# Patient Record
Sex: MALE | Race: ASIAN | Employment: STUDENT | URBAN - METROPOLITAN AREA
[De-identification: names, ages, dates, MRNs, and addresses within clinical notes are randomized per-mention and may not be internally consistent; named-entity substitution may affect disease eponyms.]

---

## 2017-10-16 ENCOUNTER — OFFICE VISIT (OUTPATIENT)
Dept: PEDIATRICS | Facility: CLINIC | Age: 22
End: 2017-10-16
Payer: COMMERCIAL

## 2017-10-16 VITALS
SYSTOLIC BLOOD PRESSURE: 126 MMHG | BODY MASS INDEX: 31.3 KG/M2 | WEIGHT: 236.2 LBS | TEMPERATURE: 97.3 F | HEART RATE: 62 BPM | DIASTOLIC BLOOD PRESSURE: 82 MMHG | HEIGHT: 73 IN

## 2017-10-16 DIAGNOSIS — Z23 NEED FOR HPV VACCINATION: ICD-10-CM

## 2017-10-16 DIAGNOSIS — Z23 NEED FOR PROPHYLACTIC VACCINATION AND INOCULATION AGAINST INFLUENZA: ICD-10-CM

## 2017-10-16 DIAGNOSIS — Z00.00 ROUTINE GENERAL MEDICAL EXAMINATION AT A HEALTH CARE FACILITY: Primary | ICD-10-CM

## 2017-10-16 PROCEDURE — 90471 IMMUNIZATION ADMIN: CPT | Performed by: INTERNAL MEDICINE

## 2017-10-16 PROCEDURE — 90472 IMMUNIZATION ADMIN EACH ADD: CPT | Performed by: INTERNAL MEDICINE

## 2017-10-16 PROCEDURE — 99385 PREV VISIT NEW AGE 18-39: CPT | Mod: 25 | Performed by: INTERNAL MEDICINE

## 2017-10-16 PROCEDURE — 90651 9VHPV VACCINE 2/3 DOSE IM: CPT | Performed by: INTERNAL MEDICINE

## 2017-10-16 PROCEDURE — 90686 IIV4 VACC NO PRSV 0.5 ML IM: CPT | Performed by: INTERNAL MEDICINE

## 2017-10-16 NOTE — PATIENT INSTRUCTIONS
Focus on healthy balanced diet and increasing physical activity.  Try getting a fitbit or go to [email or web address] [place] [employer].        Preventive Health Recommendations  Male Ages 18 - 25     Yearly exam:             See your health care provider every year in order to  o   Review health changes.   o   Discuss preventive care.    o   Review your medicines if your doctor has prescribed any.    You should be tested each year for STDs (sexually transmitted diseases).     Talk to your provider about cholesterol testing.      If you are at risk for diabetes, you should have a diabetes test (fasting glucose).    Shots: Get a flu shot each year. Get a tetanus shot every 10 years.     Nutrition:    Eat at least 5 servings of fruits and vegetables daily.     Eat whole-grain bread, whole-wheat pasta and brown rice instead of white grains and rice.     Talk to your provider about calcium and Vitamin D.     Lifestyle    Exercise for at least 150 minutes a week (30 minutes a day, 5 days a week). This will help you control your weight and prevent disease.     Limit alcohol to one drink per day.     No smoking.     Wear sunscreen to prevent skin cancer.     See your dentist every six months for an exam and cleaning.

## 2017-10-16 NOTE — PROGRESS NOTES
"  SUBJECTIVE:   CC: Cynthia Morse is an 22 year old male who presents for preventative health visit.     Healthy Habits:    Do you get at least three servings of calcium containing foods daily (dairy, green leafy vegetables, etc.)? {YES/NO, DAIRY INTAKE:906673::\"yes\"}    Amount of exercise or daily activities, outside of work: {AMOUNT EXERCISE:030098}    Problems taking medications regularly {Yes /No default:428682::\"No\"}    Medication side effects: {Yes /No default.:101382::\"No\"}    Have you had an eye exam in the past two years? {YESNOBLANK:115517}    Do you see a dentist twice per year? {YESNOBLANK:400673}    Do you have sleep apnea, excessive snoring or daytime drowsiness?{YESNOBLANK:062920}    {Outside tests to abstract? :776733}    {additional problems to add (Optional):555891}    Today's PHQ-2 Score: No flowsheet data found.  {PHQ-2 LOOK IN ASSESSMENTS :210429}  Abuse: Current or Past(Physical, Sexual or Emotional)- {YES/NO/NA:245505}  Do you feel safe in your environment - {YES/NO/NA:244896}    Social History   Substance Use Topics     Smoking status: Not on file     Smokeless tobacco: Not on file     Alcohol use Not on file     {ETOH AUDIT:158056}    Last PSA: No results found for: PSA    Reviewed orders with patient. Reviewed health maintenance and updated orders accordingly - {Yes/No:464989::\"Yes\"}  {Chronicprobdata (Optional):733523}    Reviewed and updated as needed this visit by clinical staff         Reviewed and updated as needed this visit by Provider        {HISTORY OPTIONS (Optional):697320}    ROS:  {MALE ROS-adult preventive care package:835055::\"C: NEGATIVE for fever, chills, change in weight\",\"I: NEGATIVE for worrisome rashes, moles or lesions\",\"E: NEGATIVE for vision changes or irritation\",\"ENT: NEGATIVE for ear, mouth and throat problems\",\"R: NEGATIVE for significant cough or SOB\",\"CV: NEGATIVE for chest pain, palpitations or peripheral edema\",\"GI: NEGATIVE for nausea, abdominal pain, " "heartburn, or change in bowel habits\",\" male: negative for dysuria, hematuria, decreased urinary stream, erectile dysfunction, urethral discharge\",\"M: NEGATIVE for significant arthralgias or myalgia\",\"N: NEGATIVE for weakness, dizziness or paresthesias\",\"P: NEGATIVE for changes in mood or affect\"}    OBJECTIVE:   There were no vitals taken for this visit.  EXAM:  {Exam Choices:356239}    ASSESSMENT/PLAN:   {Diag Picklist:407450}    COUNSELING:  {MALE COUNSELING MESSAGES:984016::\"Reviewed preventive health counseling, as reflected in patient instructions\"}    {BP Counseling- Complete if BP >= 120/80  (Optional):531111}     has no tobacco history on file.  {Tobacco Cessation -- Complete if patient is a smoker (Optional):271799}  There is no height or weight on file to calculate BMI.   {Weight Management Plan (ACO) Complete if BMI is abnormal-  Ages 18-64  BMI >24.9.  Age 65+ with BMI <23 or >30 (Optional):728859}    Counseling Resources:  ATP IV Guidelines  Pooled Cohorts Equation Calculator  FRAX Risk Assessment  ICSI Preventive Guidelines  Dietary Guidelines for Americans, 2010  USDA's MyPlate  ASA Prophylaxis  Lung CA Screening    Lonnie Gonzalez MD  [facility]  "

## 2017-10-16 NOTE — MR AVS SNAPSHOT
After Visit Summary   10/16/2017    Cynthia Morse    MRN: [id number]           Patient Information     Date Of Birth          1995        Visit Information        Provider Department      10/16/2017 11:50 AM Amarjit Gonzalez Mai, MD [place] [facility]        Today's Diagnoses     Routine general medical examination at a health care facility    -  1    Need for HPV vaccination        Need for prophylactic vaccination and inoculation against influenza          Care Instructions    Focus on healthy balanced diet and increasing physical activity.  Try getting a fitbit or go to www.[employer].        Preventive Health Recommendations  Male Ages 18 - 25     Yearly exam:             See your health care provider every year in order to  o   Review health changes.   o   Discuss preventive care.    o   Review your medicines if your doctor has prescribed any.    You should be tested each year for STDs (sexually transmitted diseases).     Talk to your provider about cholesterol testing.      If you are at risk for diabetes, you should have a diabetes test (fasting glucose).    Shots: Get a flu shot each year. Get a tetanus shot every 10 years.     Nutrition:    Eat at least 5 servings of fruits and vegetables daily.     Eat whole-grain bread, whole-wheat pasta and brown rice instead of white grains and rice.     Talk to your provider about calcium and Vitamin D.     Lifestyle    Exercise for at least 150 minutes a week (30 minutes a day, 5 days a week). This will help you control your weight and prevent disease.     Limit alcohol to one drink per day.     No smoking.     Wear sunscreen to prevent skin cancer.     See your dentist every six months for an exam and cleaning.             Follow-ups after your visit        Who to contact     If you have questions or need follow up information about today's clinic visit or your schedule please contact [facility] directly at  "[phone].  Normal or non-critical lab and imaging results will be communicated to you by MyChart, letter or phone within 4 business days after the clinic has received the results. If you do not hear from us within 7 days, please contact the clinic through [employer] or phone. If you have a critical or abnormal lab result, we will notify you by phone as soon as possible.  Submit refill requests through [employer] or call your pharmacy and they will forward the refill request to us. Please allow 3 business days for your refill to be completed.          Additional Information About Your Visit        [employer] Information     [employer] lets you send messages to your doctor, view your test results, renew your prescriptions, schedule appointments and more. To sign up, go to [email or web address] . Click on \"Log in\" on the left side of the screen, which will take you to the Welcome page. Then click on \"Sign up Now\" on the right side of the page.     You will be asked to enter the access code listed below, as well as some personal information. Please follow the directions to create your username and password.     Your access code is: [email or web address]  Expires: 2018 12:45 PM     Your access code will  in 90 days. If you need help or a new code, please call your [place] clinic or [phone].        Care EveryWhere ID     This is your Care EveryWhere ID. This could be used by other organizations to access your [place] medical records  [id number]        Your Vitals Were     Pulse Temperature Height BMI (Body Mass Index)          62 97.3  F (36.3  C) (Tympanic) 6' 0.5\" (1.842 m) 31.59 kg/m2         Blood Pressure from Last 3 Encounters:   10/16/17 126/82    Weight from Last 3 Encounters:   10/16/17 236 lb 3.2 oz (107.1 kg)              We Performed the Following     FLU VAC, SPLIT VIRUS IM > 3 YO (QUADRIVALENT) [57880]     HUMAN PAPILLOMA VIRUS (GARDASIL 9) VACCINE     Vaccine Administration, Each Additional [60817]     " VACCINE ADMINISTRATION, INITIAL        Primary Care Provider    None Specified       No primary provider on file.        Equal Access to Services     IGLESIA LANDON : Hadii keeley Buckley, mariluz hernandez, shaniqua bustamante. So [facility] [phone].    ATENCIÓN: Si habla español, tiene a epperson disposición servicios gratuitos de asistencia lingüística. Llame al [phone].    We comply with applicable federal civil rights laws and [place] laws. We do not discriminate on the basis of race, color, national origin, age, disability, sex, sexual orientation, or gender identity.            Thank you!     Thank you for choosing [facility]  for your care. Our goal is always to provide you with excellent care. Hearing back from our patients is one way we can continue to improve our services. Please take a few minutes to complete the written survey that you may receive in the mail after your visit with us. Thank you!             Your Updated Medication List - Protect others around you: Learn how to safely use, store and throw away your medicines at www.disposemymeds.org.      Notice  As of 10/16/2017 12:45 PM    You have not been prescribed any medications.

## 2017-10-16 NOTE — PROGRESS NOTES
SUBJECTIVE:   CC: Cynthia Morse is an 22 year old woman who presents for preventive health visit.     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  Is considering a management degree after.  He is here for routine exam, but notes that he has a mild viral illness -- feeling generalized malaise, runny nose, mild cough, pain in jaw over the last few days.  There is a virus going around at school.    Had routine lab work completed in August 2017.      Today's PHQ-2 Score:   PHQ-2 ( 1999 Pfizer) 10/16/2017   Q1: Little interest or pleasure in doing things 0   Q2: Feeling down, depressed or hopeless 0   PHQ-2 Score 0   Q1: Little interest or pleasure in doing things Not at all   Q2: Feeling down, depressed or hopeless Not at all   PHQ-2 Score 0       Abuse: Current or Past(Physical, Sexual or Emotional)- No  Do you feel safe in your environment - Yes    Social History   Substance Use Topics     Smoking status: Never Smoker     Smokeless tobacco: Never Used     Alcohol use Yes      Comment: rare     The patient does not drink >3 drinks per day nor >7 drinks per week.    Reviewed orders with patient.  Reviewed health maintenance and updated orders accordingly - Yes    Reviewed and updated as needed this visit by clinical staff  Tobacco  Allergies  Meds  Med Hx  Surg Hx  Fam Hx  Soc Hx      Reviewed and updated as needed this visit by Provider        History reviewed. No pertinent past medical history.   History reviewed.  "No pertinent surgical history.    ROS:  All other systems on a 10-point review are negative     OBJECTIVE:   /82  Pulse 62  Temp 97.3  F (36.3  C) (Tympanic)  Ht 6' 0.5\" (1.842 m)  Wt 236 lb 3.2 oz (107.1 kg)  BMI 31.59 kg/m2  EXAM:  GENERAL: healthy, alert and no distress  EYES: Eyes grossly normal to inspection, PERRL and conjunctivae and sclerae normal  HENT: ear canals and TM's normal, nose with mild congestion, mouth without ulcers or lesions  NECK: no adenopathy, no asymmetry, masses, or scars and thyroid normal to palpation  RESP: lungs clear to auscultation - no rales, rhonchi or wheezes  CV: regular rate and rhythm, normal S1 S2, no S3 or S4, no murmur, click or rub, no peripheral edema and peripheral pulses strong  ABDOMEN: soft, nontender, no hepatosplenomegaly, no masses and bowel sounds normal  MS: no gross musculoskeletal defects noted, no edema  SKIN: no suspicious lesions or rashes  PSYCH: mentation appears normal, affect normal/bright    ASSESSMENT/PLAN:   (Z00.00) Routine general medical examination at a health care facility  (primary encounter diagnosis)  Comment: no concerns.  Mild URI.  Plan: observe    (Z68.31) BMI 31.0-31.9,adult  Comment: counseled on diet and exercise  Plan: f/u in 6 months    (Z23) Need for HPV vaccination  Plan: HUMAN PAPILLOMA VIRUS (GARDASIL 9) VACCINE,         VACCINE ADMINISTRATION, INITIAL    (Z23) Need for prophylactic vaccination and inoculation against influenza  Plan: FLU VAC, SPLIT VIRUS IM > 3 YO (QUADRIVALENT)         [16818], Vaccine Administration, Each         Additional [36667], Vaccine Administration,         Initial [75278], CANCELED: FLU VAC, SPLIT VIRUS        IM > 3 YO (QUADRIVALENT) [48669]    COUNSELING:  Reviewed preventive health counseling, as reflected in patient instructions       Regular exercise       Healthy diet/nutrition         reports that he has never smoked. He has never used smokeless tobacco.    Estimated body mass index is " "31.59 kg/(m^2) as calculated from the following:    Height as of this encounter: 6' 0.5\" (1.842 m).    Weight as of this encounter: 236 lb 3.2 oz (107.1 kg).   Weight management plan: Discussed healthy diet and exercise guidelines and patient will follow up in 6 months in clinic to re-evaluate.    Counseling Resources:  ATP IV Guidelines  Pooled Cohorts Equation Calculator  Breast Cancer Risk Calculator  FRAX Risk Assessment  ICSI Preventive Guidelines  Dietary Guidelines for Americans, 2010  USDA's MyPlate  ASA Prophylaxis  Lung CA Screening    Lonnie Gonzalez MD  [facility]    Injectable Influenza Immunization Documentation    1.  Is the person to be vaccinated sick today?   No    2. Does the person to be vaccinated have an allergy to a component   of the vaccine?   No    3. Has the person to be vaccinated ever had a serious reaction   to influenza vaccine in the past?   No    4. Has the person to be vaccinated ever had Guillain-Barré syndrome?   No    Form completed by self         "

## 2017-10-16 NOTE — NURSING NOTE
"Chief Complaint   Patient presents with     Physical     Flu Shot       Initial /82  Pulse 62  Temp 97.3  F (36.3  C) (Tympanic)  Ht 6' 0.5\" (1.842 m)  Wt 236 lb 3.2 oz (107.1 kg)  BMI 31.59 kg/m2 Estimated body mass index is 31.59 kg/(m^2) as calculated from the following:    Height as of this encounter: 6' 0.5\" (1.842 m).    Weight as of this encounter: 236 lb 3.2 oz (107.1 kg).  Medication Reconciliation: complete  "

## 2017-10-16 NOTE — PROGRESS NOTES
Injectable Influenza Immunization Documentation    1.  Is the person to be vaccinated sick today?  Little under the weather    2. Does the person to be vaccinated have an allergy to a component   of the vaccine?   No    3. Has the person to be vaccinated ever had a serious reaction   to influenza vaccine in the past?   No    4. Has the person to be vaccinated ever had Guillain-Barré syndrome?   No    Form completed by Claritza Hogue MA           Answers for HPI/ROS submitted by the patient on 10/16/2017   Annual Exam:  Getting at least 3 servings of Calcium per day:: NO  Bi-annual eye exam:: Yes  Dental care twice a year:: Yes  Sleep apnea or symptoms of sleep apnea:: Excessive snoring  Diet:: Regular (no restrictions)  Frequency of exercise:: None  Taking medications regularly:: Yes  Medication side effects:: None  Additional concerns today:: No  PHQ-2 Score: 0

## 2018-12-01 ENCOUNTER — OFFICE VISIT (OUTPATIENT)
Dept: URGENT CARE | Facility: URGENT CARE | Age: 23
End: 2018-12-01
Payer: COMMERCIAL

## 2018-12-01 VITALS
OXYGEN SATURATION: 97 % | BODY MASS INDEX: 31.43 KG/M2 | TEMPERATURE: 98.4 F | DIASTOLIC BLOOD PRESSURE: 88 MMHG | WEIGHT: 235 LBS | SYSTOLIC BLOOD PRESSURE: 120 MMHG | HEART RATE: 76 BPM | RESPIRATION RATE: 12 BRPM

## 2018-12-01 DIAGNOSIS — J20.9 ACUTE BRONCHITIS, UNSPECIFIED ORGANISM: Primary | ICD-10-CM

## 2018-12-01 DIAGNOSIS — R05.9 COUGH: ICD-10-CM

## 2018-12-01 PROCEDURE — 99214 OFFICE O/P EST MOD 30 MIN: CPT | Mod: 25 | Performed by: PHYSICIAN ASSISTANT

## 2018-12-01 PROCEDURE — 94640 AIRWAY INHALATION TREATMENT: CPT | Performed by: PHYSICIAN ASSISTANT

## 2018-12-01 RX ORDER — ALBUTEROL SULFATE 0.83 MG/ML
2.5 SOLUTION RESPIRATORY (INHALATION) ONCE
Qty: 3 ML | Refills: 0
Start: 2018-12-01 | End: 2018-12-01

## 2018-12-01 RX ORDER — PREDNISONE 20 MG/1
40 TABLET ORAL DAILY
Qty: 10 TABLET | Refills: 0 | Status: SHIPPED | OUTPATIENT
Start: 2018-12-01 | End: 2018-12-06

## 2018-12-01 RX ORDER — BENZONATATE 200 MG/1
200 CAPSULE ORAL 3 TIMES DAILY PRN
Qty: 21 CAPSULE | Refills: 0 | Status: SHIPPED | OUTPATIENT
Start: 2018-12-01

## 2018-12-01 RX ORDER — ALBUTEROL SULFATE 90 UG/1
2 AEROSOL, METERED RESPIRATORY (INHALATION) EVERY 4 HOURS PRN
Qty: 1 INHALER | Refills: 0 | Status: SHIPPED | OUTPATIENT
Start: 2018-12-01

## 2018-12-01 NOTE — PROGRESS NOTES
SUBJECTIVE:   Cynthia Morse is a 23 year old male presenting with a chief complaint of cough - productive.  Onset of symptoms was 2 week(s) ago.  Course of illness is the same.    Severity moderate  Current and Associated symptoms: runny nose, cough - productive, intermittent shortness of breath, sore throat from coughing and headache  Treatment measures tried include Tylenol/Ibuprofen, OTC Cough med (Nyquil), fluids - hot teas, honeys, orange juice   Predisposing factors include exposure to other sick kids at the [place]; also has known seasonal allergies.     No past medical history on file.  No current outpatient prescriptions on file.     Social History   Substance Use Topics     Smoking status: Never Smoker     Smokeless tobacco: Never Used     Alcohol use Yes      Comment: rare       ROS:  Review of systems negative except as stated above.    OBJECTIVE:  /88  Pulse 76  Temp 98.4  F (36.9  C) (Oral)  Resp 12  Wt 235 lb (106.6 kg)  SpO2 97%  BMI 31.43 kg/m2  GENERAL APPEARANCE: healthy, alert and no distress  EYES: EOMI,  PERRL, conjunctiva clear  HENT: TM's normal bilaterally, nose and mouth without erythema, ulcers or lesions, no tonsilar hypertrophy or exudate, but nasal turbinates erythematous and swollen; posterior pharynx erythematous  NECK: supple, nontender, no lymphadenopathy  RESP: lungs clear to auscultation - no rales, rhonchi or wheezes, but decreased breath sounds RUL & RLL  CV: regular rates and rhythm, normal S1 S2, no murmur noted  NEURO: Normal strength and tone, sensory exam grossly normal,  normal speech and mentation  PSYCH: mentation appears normal and affect normal/bright    Albuterol neb administered - patient verbalized feeling a bit better after the nebulizer, easier breathing; repeat assessment by provider lungs clear throughout after neb.     ASSESSMENT:  Acute bronchitis - viral    PLAN:  Tylenol, Ibuprofen, Fluids and Rest - Rx prednisone burst to help with  airway inflammation, tessalon pearls to suppress cough and steroid inhaler 2 puffs PRN. Advised viral bronchitis can last up to 3 weeks; return if fevers, body aches, worsening productive cough, or symptoms persisting 3+ weeks. Discussed that this does not appear to have a pneumonia presentation and that chest x-ray not warranted; patient in agreement with plan.     The above evaluation was completed by Orquidea Mcintyre PA-C and transcribed by HARINI Ellison.      Pt seen in conjunction with  student, who served as a scribe for this encounter.  I independently perforned all the history and physical findings as documented in this note.  The assessment and plan reflects our joint decision-making.    Orquidea Mcintyre

## 2018-12-01 NOTE — MR AVS SNAPSHOT
"              After Visit Summary   12/1/2018    Cynthia Morse    MRN: [id number]           Patient Information     Date Of Birth          1995        Visit Information        Provider Department      12/1/2018 2:10 PM Orquidea Mcintyre PA-C [facility]        Today's Diagnoses     Acute bronchitis, unspecified organism    -  1    Cough           Follow-ups after your visit        Your next 10 appointments already scheduled     Dec 05, 2018  8:40 AM CST   SHORT with Gilma Spears NP   [facility]    [address]  [facility] [id number] [phone]              Who to contact     If you have questions or need follow up information about today's clinic visit or your schedule please contact [facility] directly at [phone].  Normal or non-critical lab and imaging results will be communicated to you by MyChart, letter or phone within 4 business days after the clinic has received the results. If you do not hear from us within 7 days, please contact the clinic through MyChart or phone. If you have a critical or abnormal lab result, we will notify you by phone as soon as possible.  Submit refill requests through [employer] or call your pharmacy and they will forward the refill request to us. Please allow 3 business days for your refill to be completed.          Additional Information About Your Visit        MyChart Information     [employer] lets you send messages to your doctor, view your test results, renew your prescriptions, schedule appointments and more. To sign up, go to [email or web address] . Click on \"Log in\" on the left side of the screen, which will take you to the Welcome page. Then click on \"Sign up Now\" on the right side of the page.     You will be asked to enter the access code listed below, as well as some personal information. Please follow the directions to create your username and " password.     Your access code is: [email or web address]  Expires: 3/1/2019  8:49 PM     Your access code will  in 90 days. If you need help or a new code, please call your [facility] or [phone].        Care EveryWhere ID     This is your Care EveryWhere ID. This could be used by other organizations to access your [place] medical records  [id number]        Your Vitals Were     Pulse Temperature Respirations Pulse Oximetry BMI (Body Mass Index)       76 98.4  F (36.9  C) (Oral) 12 97% 31.43 kg/m2        Blood Pressure from Last 3 Encounters:   18 120/88   10/16/17 126/82    Weight from Last 3 Encounters:   18 235 lb (106.6 kg)   10/16/17 236 lb 3.2 oz (107.1 kg)              We Performed the Following     INHALATION/NEBULIZER TREATMENT, INITIAL          Today's Medication Changes          These changes are accurate as of 18  8:49 PM.  If you have any questions, ask your nurse or doctor.               Start taking these medicines.        Dose/Directions    * albuterol (2.5 MG/3ML) 0.083% neb solution   Commonly known as:  PROVENTIL   Used for:  Cough, Acute bronchitis, unspecified organism   Started by:  Orquidea Mcintyre PA-C        Dose:  2.5 mg   Take 1 vial (2.5 mg) by nebulization once for 1 dose   Quantity:  3 mL   Refills:  0       * albuterol 108 (90 Base) MCG/ACT inhaler   Commonly known as:  PROAIR HFA/PROVENTIL HFA/VENTOLIN HFA   Used for:  Acute bronchitis, unspecified organism   Started by:  Orquidea Mcintyre PA-C        Dose:  2 puff   Inhale 2 puffs into the lungs every 4 hours as needed for shortness of breath / dyspnea or wheezing   Quantity:  1 Inhaler   Refills:  0       benzonatate 200 MG capsule   Commonly known as:  TESSALON   Used for:  Cough, Acute bronchitis, unspecified organism   Started by:  Orquidea Mcintyre PA-C        Dose:  200 mg   Take 1 capsule (200 mg) by mouth 3 times daily as needed for cough   Quantity:  21 capsule   Refills:  0        predniSONE 20 MG tablet   Commonly known as:  DELTASONE   Used for:  Cough, Acute bronchitis, unspecified organism   Started by:  Orquidea Mcintyre PA-C        Dose:  40 mg   Take 2 tablets (40 mg) by mouth daily for 5 days   Quantity:  10 tablet   Refills:  0       * Notice:  This list has 2 medication(s) that are the same as other medications prescribed for you. Read the directions carefully, and ask your doctor or other care provider to review them with you.         Where to get your medicines      These medications were sent to [id number] - [place], [place] - [address]  AT [address] [facility]  [address] [place] [address]     Phone:  [phone]     albuterol 108 (90 Base) MCG/ACT inhaler    benzonatate 200 MG capsule    predniSONE 20 MG tablet         Some of these will need a paper prescription and others can be bought over the counter.  Ask your nurse if you have questions.     You don't need a prescription for these medications     albuterol (2.5 MG/3ML) 0.083% neb solution                Primary Care Provider Fax #    Physician No Ref-Primary [phone]       No address on file        Equal Access to Services     [facility]: Hadii keeley Buckley, mariluz hernandez, qaybta kaalmataco dodge, shaniqua adames. So [facility] [phone].    ATENCIÓN: Si habla español, tiene a epperson disposición servicios gratuitos de asistencia lingüística. Llame al [phone].    We comply with applicable federal civil rights laws and [place] laws. We do not discriminate on the basis of race, color, national origin, age, disability, sex, sexual orientation, or gender identity.            Thank you!     Thank you for choosing [facility]  for your care. Our goal is always to provide you with excellent care. Hearing back from our patients is one way we can continue to improve our services. Please take a few minutes to complete the  written survey that you may receive in the mail after your visit with us. Thank you!             Your Updated Medication List - Protect others around you: Learn how to safely use, store and throw away your medicines at www.disposemAdvanced Power Projectseds.org.          This list is accurate as of 12/1/18  8:49 PM.  Always use your most recent med list.                   Brand Name Dispense Instructions for use Diagnosis    * albuterol (2.5 MG/3ML) 0.083% neb solution    PROVENTIL    3 mL    Take 1 vial (2.5 mg) by nebulization once for 1 dose    Cough, Acute bronchitis, unspecified organism       * albuterol 108 (90 Base) MCG/ACT inhaler    PROAIR HFA/PROVENTIL HFA/VENTOLIN HFA    1 Inhaler    Inhale 2 puffs into the lungs every 4 hours as needed for shortness of breath / dyspnea or wheezing    Acute bronchitis, unspecified organism       benzonatate 200 MG capsule    TESSALON    21 capsule    Take 1 capsule (200 mg) by mouth 3 times daily as needed for cough    Cough, Acute bronchitis, unspecified organism       predniSONE 20 MG tablet    DELTASONE    10 tablet    Take 2 tablets (40 mg) by mouth daily for 5 days    Cough, Acute bronchitis, unspecified organism       * Notice:  This list has 2 medication(s) that are the same as other medications prescribed for you. Read the directions carefully, and ask your doctor or other care provider to review them with you.

## 2018-12-01 NOTE — NURSING NOTE
"Chief Complaint   Patient presents with     Urgent Care     Cough     Pt has had a cough for over 2 weeks now.  Cough is keeping him awake at night.  Cough is productive at times.  He thinks he may have had a fever and is worried he has bronchitis.     Initial /88  Pulse 76  Temp 98.4  F (36.9  C) (Oral)  Resp 12  Wt 235 lb (106.6 kg)  SpO2 97%  BMI 31.43 kg/m2 Estimated body mass index is 31.43 kg/(m^2) as calculated from the following:    Height as of 10/16/17: 6' 0.5\" (1.842 m).    Weight as of this encounter: 235 lb (106.6 kg)..  BP completed using cuff size: stephanie Medeiros R.N.    "